# Patient Record
(demographics unavailable — no encounter records)

---

## 2021-01-15 DIAGNOSIS — Z23 NEED FOR VACCINATION: ICD-10-CM

## 2022-05-15 NOTE — LETTER
"May 17, 2022        Jeanette Puentes  [address] [place] [address]        Dear Jeanette:    Our office has attempted to reach you by telephone with no luck. The phone number on file is disconnected and we have no other listed phone numbers to call to reach you. Please contact our office to update your contact information for important results and updates. Per Jayna HERNANDEZ:    The ultrasound of your kidneys were normal. There are no kidney stones and no \"water on your kidneys.\"    If you have any questions or concerns, please don't hesitate to call.        Sincerely,        Your [facility] Team    Electronically Signed     "

## 2022-05-16 NOTE — RESULT ENCOUNTER NOTE
"Please call pt and let them know the ultrasound of her kidneys was normal.  No kidney stones and no \"water on her kidneys\".  Thank you"